# Patient Record
Sex: FEMALE | Race: WHITE
[De-identification: names, ages, dates, MRNs, and addresses within clinical notes are randomized per-mention and may not be internally consistent; named-entity substitution may affect disease eponyms.]

---

## 2022-08-10 ENCOUNTER — HOSPITAL ENCOUNTER (EMERGENCY)
Dept: HOSPITAL 26 - MED | Age: 27
Discharge: HOME | End: 2022-08-10
Payer: COMMERCIAL

## 2022-08-10 VITALS — SYSTOLIC BLOOD PRESSURE: 109 MMHG | DIASTOLIC BLOOD PRESSURE: 76 MMHG

## 2022-08-10 VITALS — BODY MASS INDEX: 34.38 KG/M2 | HEIGHT: 63 IN | WEIGHT: 194 LBS

## 2022-08-10 DIAGNOSIS — R11.2: ICD-10-CM

## 2022-08-10 DIAGNOSIS — R10.13: Primary | ICD-10-CM

## 2022-08-10 DIAGNOSIS — R30.0: ICD-10-CM

## 2022-08-10 PROCEDURE — 99283 EMERGENCY DEPT VISIT LOW MDM: CPT

## 2022-08-10 PROCEDURE — 96372 THER/PROPH/DIAG INJ SC/IM: CPT

## 2022-08-10 PROCEDURE — 81025 URINE PREGNANCY TEST: CPT

## 2022-08-10 PROCEDURE — 81002 URINALYSIS NONAUTO W/O SCOPE: CPT

## 2022-08-10 NOTE — NUR
NO ADVERSE REACTION NOTED. DISCHARGE AND F/U INSTRUCTIONS PROVIDED TO PT AND 
VERBALIZED UNDERSTANDING. PT REPORTS PAIN TO BE "EASING NOW". PT WALKED OUT OF 
THE ER WITH STEADY STABLE GAIT. PT STABLE FOR DISCHARGE.